# Patient Record
Sex: MALE | Race: BLACK OR AFRICAN AMERICAN | URBAN - METROPOLITAN AREA
[De-identification: names, ages, dates, MRNs, and addresses within clinical notes are randomized per-mention and may not be internally consistent; named-entity substitution may affect disease eponyms.]

---

## 2022-01-17 SDOH — HEALTH STABILITY: PHYSICAL HEALTH: ON AVERAGE, HOW MANY DAYS PER WEEK DO YOU ENGAGE IN MODERATE TO STRENUOUS EXERCISE (LIKE A BRISK WALK)?: 2 DAYS

## 2022-01-17 SDOH — HEALTH STABILITY: PHYSICAL HEALTH: ON AVERAGE, HOW MANY MINUTES DO YOU ENGAGE IN EXERCISE AT THIS LEVEL?: 60 MIN

## 2022-01-17 ASSESSMENT — SOCIAL DETERMINANTS OF HEALTH (SDOH)

## 2022-01-19 ENCOUNTER — OFFICE VISIT (OUTPATIENT)
Dept: INTERNAL MEDICINE CLINIC | Age: 42
End: 2022-01-19
Payer: COMMERCIAL

## 2022-01-19 VITALS
HEART RATE: 85 BPM | OXYGEN SATURATION: 100 % | BODY MASS INDEX: 31.36 KG/M2 | DIASTOLIC BLOOD PRESSURE: 96 MMHG | WEIGHT: 224 LBS | HEIGHT: 71 IN | SYSTOLIC BLOOD PRESSURE: 146 MMHG

## 2022-01-19 DIAGNOSIS — R42 DIZZINESS: ICD-10-CM

## 2022-01-19 DIAGNOSIS — Z13.1 SCREENING FOR DIABETES MELLITUS: ICD-10-CM

## 2022-01-19 DIAGNOSIS — Z00.00 ENCOUNTER FOR WELL ADULT EXAM WITHOUT ABNORMAL FINDINGS: ICD-10-CM

## 2022-01-19 DIAGNOSIS — Z11.59 NEED FOR HEPATITIS C SCREENING TEST: ICD-10-CM

## 2022-01-19 DIAGNOSIS — Z13.0 SCREENING FOR DEFICIENCY ANEMIA: ICD-10-CM

## 2022-01-19 DIAGNOSIS — R03.0 ELEVATED BLOOD PRESSURE READING: ICD-10-CM

## 2022-01-19 DIAGNOSIS — Z13.220 SCREENING FOR LIPID DISORDERS: ICD-10-CM

## 2022-01-19 DIAGNOSIS — R03.0 ELEVATED BLOOD PRESSURE READING: Primary | ICD-10-CM

## 2022-01-19 DIAGNOSIS — Z11.4 SCREENING FOR HIV WITHOUT PRESENCE OF RISK FACTORS: ICD-10-CM

## 2022-01-19 LAB
A/G RATIO: 1.6 (ref 1.1–2.2)
ALBUMIN SERPL-MCNC: 4.5 G/DL (ref 3.4–5)
ALP BLD-CCNC: 91 U/L (ref 40–129)
ALT SERPL-CCNC: 56 U/L (ref 10–40)
ANION GAP SERPL CALCULATED.3IONS-SCNC: 15 MMOL/L (ref 3–16)
AST SERPL-CCNC: 31 U/L (ref 15–37)
BASOPHILS ABSOLUTE: 0 K/UL (ref 0–0.2)
BASOPHILS RELATIVE PERCENT: 0.3 %
BILIRUB SERPL-MCNC: 0.5 MG/DL (ref 0–1)
BUN BLDV-MCNC: 11 MG/DL (ref 7–20)
CALCIUM SERPL-MCNC: 9.5 MG/DL (ref 8.3–10.6)
CHLORIDE BLD-SCNC: 102 MMOL/L (ref 99–110)
CHOLESTEROL, TOTAL: 213 MG/DL (ref 0–199)
CO2: 22 MMOL/L (ref 21–32)
CREAT SERPL-MCNC: 1.1 MG/DL (ref 0.9–1.3)
EOSINOPHILS ABSOLUTE: 0.1 K/UL (ref 0–0.6)
EOSINOPHILS RELATIVE PERCENT: 2 %
GFR AFRICAN AMERICAN: >60
GFR NON-AFRICAN AMERICAN: >60
GLUCOSE BLD-MCNC: 97 MG/DL (ref 70–99)
HCT VFR BLD CALC: 45.1 % (ref 40.5–52.5)
HDLC SERPL-MCNC: 35 MG/DL (ref 40–60)
HEMOGLOBIN: 15 G/DL (ref 13.5–17.5)
HEPATITIS C ANTIBODY INTERPRETATION: NORMAL
LDL CHOLESTEROL CALCULATED: 160 MG/DL
LYMPHOCYTES ABSOLUTE: 2.6 K/UL (ref 1–5.1)
LYMPHOCYTES RELATIVE PERCENT: 53.7 %
MCH RBC QN AUTO: 29.5 PG (ref 26–34)
MCHC RBC AUTO-ENTMCNC: 33.3 G/DL (ref 31–36)
MCV RBC AUTO: 88.8 FL (ref 80–100)
MONOCYTES ABSOLUTE: 0.5 K/UL (ref 0–1.3)
MONOCYTES RELATIVE PERCENT: 11.3 %
NEUTROPHILS ABSOLUTE: 1.6 K/UL (ref 1.7–7.7)
NEUTROPHILS RELATIVE PERCENT: 32.7 %
PDW BLD-RTO: 14.2 % (ref 12.4–15.4)
PLATELET # BLD: 256 K/UL (ref 135–450)
PMV BLD AUTO: 8.7 FL (ref 5–10.5)
POTASSIUM SERPL-SCNC: 4.5 MMOL/L (ref 3.5–5.1)
RBC # BLD: 5.08 M/UL (ref 4.2–5.9)
SODIUM BLD-SCNC: 139 MMOL/L (ref 136–145)
TOTAL PROTEIN: 7.3 G/DL (ref 6.4–8.2)
TRIGL SERPL-MCNC: 89 MG/DL (ref 0–150)
VLDLC SERPL CALC-MCNC: 18 MG/DL
WBC # BLD: 4.8 K/UL (ref 4–11)

## 2022-01-19 PROCEDURE — 99386 PREV VISIT NEW AGE 40-64: CPT | Performed by: FAMILY MEDICINE

## 2022-01-19 PROCEDURE — 99204 OFFICE O/P NEW MOD 45 MIN: CPT | Performed by: FAMILY MEDICINE

## 2022-01-19 PROCEDURE — 90674 CCIIV4 VAC NO PRSV 0.5 ML IM: CPT | Performed by: FAMILY MEDICINE

## 2022-01-19 PROCEDURE — 90471 IMMUNIZATION ADMIN: CPT | Performed by: FAMILY MEDICINE

## 2022-01-19 RX ORDER — DEXTROMETHORPHAN HYDROBROMIDE AND PROMETHAZINE HYDROCHLORIDE 15; 6.25 MG/5ML; MG/5ML
SYRUP ORAL
COMMUNITY
Start: 2021-10-15 | End: 2022-01-19

## 2022-01-19 RX ORDER — IBUPROFEN 800 MG/1
TABLET ORAL
COMMUNITY
Start: 2021-10-15 | End: 2022-01-19

## 2022-01-19 ASSESSMENT — ENCOUNTER SYMPTOMS
CONSTIPATION: 0
COUGH: 0
NAUSEA: 0
SHORTNESS OF BREATH: 0
VOMITING: 0
DIARRHEA: 0

## 2022-01-19 ASSESSMENT — PATIENT HEALTH QUESTIONNAIRE - PHQ9
SUM OF ALL RESPONSES TO PHQ QUESTIONS 1-9: 0
1. LITTLE INTEREST OR PLEASURE IN DOING THINGS: 0
2. FEELING DOWN, DEPRESSED OR HOPELESS: 0
SUM OF ALL RESPONSES TO PHQ QUESTIONS 1-9: 0
SUM OF ALL RESPONSES TO PHQ9 QUESTIONS 1 & 2: 0

## 2022-01-19 NOTE — PROGRESS NOTES
Criselda Wheeler (:  1980) is a 35 y.o. male,New patient, here for evaluation of the following chief complaint(s):  New Patient      SUBJECTIVE:  Last primary doctor was 4 years ago  Was not diagnosed with any mecial problems    Since December he has had intermittent episodes of dizziness once or twice per week. Not currently having symptoms. It comes and goes. Happened more often in December, but no longer happening  Also states that he has had intermittent dry throat once or twice per week,     Currently working [employer]. Works at [employer] for them. I have reviewed the chart notes available from myself and other providers. I have reviewed and addressed all active problems and created or updated the problems list in detail, as needed    I have extensively reviewed and reconciled the medication list, discontinued medications not taking or no longer appropriate, and updated the active meds list    OBJECTIVE:  Review of Systems   Constitutional: Negative for chills and fever. Respiratory: Negative for cough and shortness of breath. Cardiovascular: Negative for leg swelling. Gastrointestinal: Negative for constipation, diarrhea, nausea and vomiting. Endocrine: Negative for polyuria. Genitourinary: Negative for frequency. Skin: Negative for rash. Vitals:    22 1119 22 1141   BP: (!) 144/96 (!) 146/96   Pulse: 85    SpO2: 100%    Weight: 224 lb (101.6 kg)    Height: 5' 10.5\" (1.791 m)       Body mass index is 31.69 kg/m². Physical Exam  Constitutional:       Appearance: Normal appearance. Cardiovascular:      Rate and Rhythm: Normal rate and regular rhythm. Pulses: Normal pulses. Heart sounds: Normal heart sounds. Pulmonary:      Effort: Pulmonary effort is normal.      Breath sounds: Normal breath sounds. Musculoskeletal:      Right lower leg: No edema. Left lower leg: No edema.    Neurological:      General: No focal deficit present. Mental Status: He is alert. Mental status is at baseline. No results found for: LABA1C  No results found for: WBC, HGB, HCT, MCV, PLT   No results found for: NA, K, CL, CO2, BUN, CREATININE, GLUCOSE, CALCIUM    No results found for: CHOL  No results found for: TRIG  No results found for: HDL  No results found for: LDLCHOLESTEROL, LDLCALC  No results found for: LABVLDL, VLDL  No results found for: CHOLHDLRATIO     The ASCVD Risk score (Linsey Tillman, et al., 2013) failed to calculate for the following reasons:    Cannot find a previous HDL lab    Cannot find a previous total cholesterol lab     Patient received counseling and, if relevant, printed instructions for all symptoms listed in CC and HPI, as well as for all diagnoses brought onto today's visit note below. Typical counseling includes, but is not limited to, non-pharmacologic measures to manage listed symptoms and conditions; appropriate use, risks and benefits for all prescribed medications; potential interactions between medications both prescribed and OTC; diet; exercise; healthy behaviors; and goalsetting to improve health. Patient or responsible party was involved in shared decision making and had opportunity to have all questions answered. Except as noted below, all chronic problems have been reviewed and are stable to continue medications or other therapy as previously documented in the patient's chart, with changes per orders or documentation below:    1. Elevated blood pressure reading  -     Comprehensive Metabolic Panel; Future  2. Dizziness  -     CBC Auto Differential; Future  3. Encounter for well adult exam without abnormal findings  4. Screening for HIV without presence of risk factors  -     HIV Screen; Future  5. Need for hepatitis C screening test  -     Hepatitis C Antibody; Future  6. Screening for diabetes mellitus  -     Hemoglobin A1C; Future  7.  Screening for deficiency anemia  -     CBC Auto only contact the office one time per issue or question, and please allow one full business day for a call back. Calling us back multiple times keeps us from being able to complete the work efficiently for you and our other patients. For medication renewals, please call your pharmacist to contact us, and be sure to allow at least 3 business days for processing before you need to  your medication. If you are sick or need an appointment that hasn't been planned, same day appointments are available every day the office is open: Monday, Tuesday, Wednesday, Thursday, and Friday. Call during office hours to schedule, even if it may not be with your regular physician. You may also call the office after 8 am on office days if you need to be seen from an issue the night before. During hours when the office is not normally open, your call will go to the messaging service which cannot provide any service other than paging the doctor. No prescriptions or other nonurgent matters will be handled and no voicemail is available, so please call back during office hours for these matters.        Electronically signed by Angely Murdock DO on 1/19/2022 at 12:52 PM.

## 2022-01-20 LAB
ESTIMATED AVERAGE GLUCOSE: 125.5 MG/DL
HBA1C MFR BLD: 6 %
HIV AG/AB: NORMAL
HIV ANTIGEN: NORMAL
HIV-1 ANTIBODY: NORMAL
HIV-2 AB: NORMAL

## 2022-01-21 DIAGNOSIS — E78.2 MIXED HYPERLIPIDEMIA: Primary | ICD-10-CM

## 2022-01-21 RX ORDER — ATORVASTATIN CALCIUM 20 MG/1
20 TABLET, FILM COATED ORAL DAILY
Qty: 90 TABLET | Refills: 1 | Status: SHIPPED | OUTPATIENT
Start: 2022-01-21 | End: 2022-07-14

## 2022-01-27 ENCOUNTER — TELEPHONE (OUTPATIENT)
Dept: INTERNAL MEDICINE CLINIC | Age: 42
End: 2022-01-27

## 2022-03-02 ENCOUNTER — OFFICE VISIT (OUTPATIENT)
Dept: INTERNAL MEDICINE CLINIC | Age: 42
End: 2022-03-02
Payer: COMMERCIAL

## 2022-03-02 VITALS
WEIGHT: 218 LBS | HEART RATE: 86 BPM | OXYGEN SATURATION: 100 % | DIASTOLIC BLOOD PRESSURE: 83 MMHG | SYSTOLIC BLOOD PRESSURE: 129 MMHG | BODY MASS INDEX: 30.84 KG/M2

## 2022-03-02 DIAGNOSIS — E78.2 MIXED HYPERLIPIDEMIA: Primary | ICD-10-CM

## 2022-03-02 DIAGNOSIS — R73.03 PREDIABETES: ICD-10-CM

## 2022-03-02 DIAGNOSIS — E78.2 MIXED HYPERLIPIDEMIA: ICD-10-CM

## 2022-03-02 PROBLEM — R42 DIZZINESS: Status: RESOLVED | Noted: 2022-01-19 | Resolved: 2022-03-02

## 2022-03-02 LAB
CHOLESTEROL, TOTAL: 130 MG/DL (ref 0–199)
HDLC SERPL-MCNC: 37 MG/DL (ref 40–60)
LDL CHOLESTEROL CALCULATED: 80 MG/DL
TRIGL SERPL-MCNC: 64 MG/DL (ref 0–150)
VLDLC SERPL CALC-MCNC: 13 MG/DL

## 2022-03-02 PROCEDURE — 99214 OFFICE O/P EST MOD 30 MIN: CPT | Performed by: FAMILY MEDICINE

## 2022-03-02 ASSESSMENT — ENCOUNTER SYMPTOMS
NAUSEA: 0
DIARRHEA: 0
VOMITING: 0
SHORTNESS OF BREATH: 0
CONSTIPATION: 0
COUGH: 0

## 2022-03-02 NOTE — PROGRESS NOTES
Devyn Wheeler (:  1980) is a 35 y.o. male,New patient, here for evaluation of the following chief complaint(s):  Follow-up      SUBJECTIVE:  3. -- stopped eating sugary treats, fatty greasy food. Kids are 2 and 1. Last primary doctor was 4 years ago  Was not diagnosed with any mecial problems    Since December he has had intermittent episodes of dizziness once or twice per week. Not currently having symptoms. It comes and goes. Happened more often in December, but no longer happening  Also states that he has had intermittent dry throat once or twice per week,     Currently working [employer]. Works at [employer] for [employer]. I have reviewed the chart notes available from myself and other providers. I have reviewed and addressed all active problems and created or updated the problems list in detail, as needed    I have extensively reviewed and reconciled the medication list, discontinued medications not taking or no longer appropriate, and updated the active meds list    OBJECTIVE:  Review of Systems   Constitutional: Negative for chills and fever. Respiratory: Negative for cough and shortness of breath. Cardiovascular: Negative for leg swelling. Gastrointestinal: Negative for constipation, diarrhea, nausea and vomiting. Endocrine: Negative for polyuria. Genitourinary: Negative for frequency. Skin: Negative for rash. Vitals:    22 1351   BP: 129/83   Pulse: 86   SpO2: 100%   Weight: 218 lb (98.9 kg)      Body mass index is 30.84 kg/m². Physical Exam  Constitutional:       Appearance: Normal appearance. Cardiovascular:      Rate and Rhythm: Normal rate and regular rhythm. Pulses: Normal pulses. Heart sounds: Normal heart sounds. Pulmonary:      Effort: Pulmonary effort is normal.      Breath sounds: Normal breath sounds. Musculoskeletal:      Right lower leg: No edema. Left lower leg: No edema.    Neurological: General: No focal deficit present. Mental Status: He is alert. Mental status is at baseline. Lab Results   Component Value Date    LABA1C 6.0 01/19/2022     Lab Results   Component Value Date    WBC 4.8 01/19/2022    HGB 15.0 01/19/2022    HCT 45.1 01/19/2022    MCV 88.8 01/19/2022     01/19/2022      Lab Results   Component Value Date     01/19/2022    K 4.5 01/19/2022     01/19/2022    CO2 22 01/19/2022    BUN 11 01/19/2022    CREATININE 1.1 01/19/2022    GLUCOSE 97 01/19/2022    CALCIUM 9.5 01/19/2022       Lab Results   Component Value Date    CHOL 213 (H) 01/19/2022     Lab Results   Component Value Date    TRIG 89 01/19/2022     Lab Results   Component Value Date    HDL 35 (L) 01/19/2022     Lab Results   Component Value Date    LDLCALC 160 (H) 01/19/2022     Lab Results   Component Value Date    LABVLDL 18 01/19/2022     No results found for: CHOLHDLRATIO     The 10-year ASCVD risk score (Nuria Foss et al., 2013) is: 4.2%    Values used to calculate the score:      Age: 35 years      Sex: Male      Is Non- : Yes      Diabetic: No      Tobacco smoker: No      Systolic Blood Pressure: 487 mmHg      Is BP treated: No      HDL Cholesterol: 35 mg/dL      Total Cholesterol: 213 mg/dL     Patient received counseling and, if relevant, printed instructions for all symptoms listed in CC and HPI, as well as for all diagnoses brought onto today's visit note below. Typical counseling includes, but is not limited to, non-pharmacologic measures to manage listed symptoms and conditions; appropriate use, risks and benefits for all prescribed medications; potential interactions between medications both prescribed and OTC; diet; exercise; healthy behaviors; and goalsetting to improve health. Patient or responsible party was involved in shared decision making and had opportunity to have all questions answered.   Except as noted below, all chronic problems have been reviewed and are stable to continue medications or other therapy as previously documented in the patient's chart, with changes per orders or documentation below:    1. Mixed hyperlipidemia  -     Lipid Panel; Future  2. Prediabetes          Problem List        Unprioritized    Mixed hyperlipidemia - Primary    Relevant Medications    atorvastatin (LIPITOR) 20 MG tablet    Other Relevant Orders    Lipid Panel    Prediabetes        Orders Placed This Encounter   Procedures    Lipid Panel     Standing Status:   Future     Number of Occurrences:   1     Standing Expiration Date:   3/2/2023     Order Specific Question:   Is Patient Fasting?/# of Hours     Answer:   yes -- ok if not per DR. Lita Hudson       Return in about 2 months (around 5/2/2022) for recheck A1c this visit, address lipid lab. [facility] - [facility]  Dr. Jarrett Flowers D.O.  - Family Medicine and T      Usual doctor's hours are:     Monday 7:30 am to 6:00 pm           Tuesday  7:30 am to 5:00 pm                                               Wednesday 7:30 am to 5:00 pm                                               Thursday 7:30 am to 5:00 pm                                               Friday 7:30 am to 4:00 pm           Saturdays, Sundays, and after hours: E-Visits are available    We observe most federal holidays and Good Friday. We ask that you only contact the office one time per issue or question, and please allow one full business day for a call back. Calling us back multiple times keeps us from being able to complete the work efficiently for you and our other patients. For medication renewals, please call your pharmacist to contact us, and be sure to allow at least 3 business days for processing before you need to  your medication.      If you are sick or need an appointment that hasn't been planned, same day appointments are available every day the office is open: Monday, Tuesday, Wednesday, Thursday, and Friday. Call during office hours to schedule, even if it may not be with your regular physician. You may also call the office after 8 am on office days if you need to be seen from an issue the night before. During hours when the office is not normally open, your call will go to the messaging service which cannot provide any service other than paging the doctor. No prescriptions or other nonurgent matters will be handled and no voicemail is available, so please call back during office hours for these matters.        Electronically signed by Delaney Villalta DO on 3/2/2022 at 3:19 PM.

## 2022-05-02 ENCOUNTER — OFFICE VISIT (OUTPATIENT)
Dept: INTERNAL MEDICINE CLINIC | Age: 42
End: 2022-05-02
Payer: COMMERCIAL

## 2022-05-02 VITALS
BODY MASS INDEX: 30.55 KG/M2 | DIASTOLIC BLOOD PRESSURE: 80 MMHG | WEIGHT: 216 LBS | OXYGEN SATURATION: 98 % | SYSTOLIC BLOOD PRESSURE: 127 MMHG | HEART RATE: 73 BPM

## 2022-05-02 DIAGNOSIS — E78.2 MIXED HYPERLIPIDEMIA: ICD-10-CM

## 2022-05-02 DIAGNOSIS — R73.03 PREDIABETES: ICD-10-CM

## 2022-05-02 DIAGNOSIS — R73.03 PREDIABETES: Primary | ICD-10-CM

## 2022-05-02 LAB
CHOLESTEROL, TOTAL: 162 MG/DL (ref 0–199)
HDLC SERPL-MCNC: 37 MG/DL (ref 40–60)
LDL CHOLESTEROL CALCULATED: 104 MG/DL
TRIGL SERPL-MCNC: 107 MG/DL (ref 0–150)
VLDLC SERPL CALC-MCNC: 21 MG/DL

## 2022-05-02 PROCEDURE — 90471 IMMUNIZATION ADMIN: CPT | Performed by: FAMILY MEDICINE

## 2022-05-02 PROCEDURE — 99214 OFFICE O/P EST MOD 30 MIN: CPT | Performed by: FAMILY MEDICINE

## 2022-05-02 PROCEDURE — 90715 TDAP VACCINE 7 YRS/> IM: CPT | Performed by: FAMILY MEDICINE

## 2022-05-02 SDOH — ECONOMIC STABILITY: FOOD INSECURITY: WITHIN THE PAST 12 MONTHS, YOU WORRIED THAT YOUR FOOD WOULD RUN OUT BEFORE YOU GOT MONEY TO BUY MORE.: NEVER TRUE

## 2022-05-02 SDOH — ECONOMIC STABILITY: FOOD INSECURITY: WITHIN THE PAST 12 MONTHS, THE FOOD YOU BOUGHT JUST DIDN'T LAST AND YOU DIDN'T HAVE MONEY TO GET MORE.: NEVER TRUE

## 2022-05-02 ASSESSMENT — ENCOUNTER SYMPTOMS
VOMITING: 0
COUGH: 0
DIARRHEA: 0
CONSTIPATION: 0
NAUSEA: 0
SHORTNESS OF BREATH: 0

## 2022-05-02 ASSESSMENT — SOCIAL DETERMINANTS OF HEALTH (SDOH): HOW HARD IS IT FOR YOU TO PAY FOR THE VERY BASICS LIKE FOOD, HOUSING, MEDICAL CARE, AND HEATING?: NOT HARD AT ALL

## 2022-05-02 NOTE — PROGRESS NOTES
Devyn Wheeler (:  1980) is a 38 y.o. male,Established patient, here for evaluation of the following chief complaint(s):  Follow-up (2 MO. )      SUBJECTIVE:  5..22 -- follow up after Lipitor. Last A1c was 6.0 in January, will recheck A1c as well to see if this is improved. Pt has lost almost 10 lb since January, congratulated him on this and on his diet changes. -- needs TDAP today. 3.2.22 -- stopped eating sugary treats, fatty greasy food. Kids are 2 and 1.     22 - new patient  Last primary doctor was 4 years ago  Was not diagnosed with any medical problems    Since December he has had intermittent episodes of dizziness once or twice per week. Not currently having symptoms. It comes and goes. Happened more often in December, but no longer happening  Also states that he has had intermittent dry throat once or twice per week,     Currently working [employer]. Works at [employer]. I have reviewed the chart notes available from myself and other providers. I have reviewed and addressed all active problems and created or updated the problems list in detail, as needed    I have extensively reviewed and reconciled the medication list, discontinued medications not taking or no longer appropriate, and updated the active meds list    OBJECTIVE:  Review of Systems   Constitutional: Negative for chills and fever. Respiratory: Negative for cough and shortness of breath. Cardiovascular: Negative for leg swelling. Gastrointestinal: Negative for constipation, diarrhea, nausea and vomiting. Endocrine: Negative for polyuria. Genitourinary: Negative for frequency. Skin: Negative for rash. Vitals:    22 1352   BP: 127/80   Pulse: 73   SpO2: 98%   Weight: 216 lb (98 kg)      Body mass index is 30.55 kg/m². Physical Exam  Constitutional:       Appearance: Normal appearance.    Cardiovascular:      Rate and Rhythm: Normal rate and regular rhythm. Pulses: Normal pulses. Heart sounds: Normal heart sounds. Pulmonary:      Effort: Pulmonary effort is normal.      Breath sounds: Normal breath sounds. Musculoskeletal:      Right lower leg: No edema. Left lower leg: No edema. Neurological:      General: No focal deficit present. Mental Status: He is alert. Mental status is at baseline. Lab Results   Component Value Date    LABA1C 6.0 01/19/2022     Lab Results   Component Value Date    WBC 4.8 01/19/2022    HGB 15.0 01/19/2022    HCT 45.1 01/19/2022    MCV 88.8 01/19/2022     01/19/2022      Lab Results   Component Value Date     01/19/2022    K 4.5 01/19/2022     01/19/2022    CO2 22 01/19/2022    BUN 11 01/19/2022    CREATININE 1.1 01/19/2022    GLUCOSE 97 01/19/2022    CALCIUM 9.5 01/19/2022       Lab Results   Component Value Date    CHOL 130 03/02/2022    CHOL 213 (H) 01/19/2022     Lab Results   Component Value Date    TRIG 64 03/02/2022    TRIG 89 01/19/2022     Lab Results   Component Value Date    HDL 37 (L) 03/02/2022    HDL 35 (L) 01/19/2022     Lab Results   Component Value Date    LDLCALC 80 03/02/2022    LDLCALC 160 (H) 01/19/2022     Lab Results   Component Value Date    LABVLDL 13 03/02/2022    LABVLDL 18 01/19/2022     No results found for: CHOLHDLRATIO     The 10-year ASCVD risk score (Brad Krause, et al., 2013) is: 3.7%    Values used to calculate the score:      Age: 38 years      Sex: Male      Is Non- : Yes      Diabetic: No      Tobacco smoker: No      Systolic Blood Pressure: 386 mmHg      Is BP treated: No      HDL Cholesterol: 37 mg/dL      Total Cholesterol: 130 mg/dL     Patient received counseling and, if relevant, printed instructions for all symptoms listed in CC and HPI, as well as for all diagnoses brought onto today's visit note below.  Typical counseling includes, but is not limited to, non-pharmacologic measures to manage listed symptoms and conditions; appropriate use, risks and benefits for all prescribed medications; potential interactions between medications both prescribed and OTC; diet; exercise; healthy behaviors; and goalsetting to improve health. Patient or responsible party was involved in shared decision making and had opportunity to have all questions answered. Except as noted below, all chronic problems have been reviewed and are stable to continue medications or other therapy as previously documented in the patient's chart, with changes per orders or documentation below:    1. Prediabetes  -     Hemoglobin A1C; Future  2. Mixed hyperlipidemia  -     Lipid Panel; Future          Problem List        Unprioritized    Mixed hyperlipidemia    Relevant Medications    atorvastatin (LIPITOR) 20 MG tablet    Other Relevant Orders    Lipid Panel    Prediabetes - Primary    Relevant Orders    Hemoglobin A1C        Orders Placed This Encounter   Procedures    Tdap (age 6y and older) IM ([address])    Lipid Panel     Standing Status:   Future     Number of Occurrences:   1     Standing Expiration Date:   5/2/2023     Order Specific Question:   Is Patient Fasting?/# of Hours     Answer:   yes    Hemoglobin A1C     Standing Status:   Future     Number of Occurrences:   1     Standing Expiration Date:   5/2/2023       Return in about 6 months (around 11/2/2022) for follow up hyperlipids, prediabetes. [facility] - Internal Medicine and Pediatrics  Dr. Daphnie Velez D.O.  - Family Medicine and T      Usual doctor's hours are:     Monday 7:30 am to 6:00 pm           Tuesday  7:30 am to 5:00 pm                                               Wednesday 7:30 am to 5:00 pm                                               Thursday 7:30 am to 5:00 pm                                               Friday 7:30 am to 4:00 pm           Saturdays, Sundays, and after hours: E-Visits are available    We observe most federal holidays and Good Friday.     We ask that you only contact the office one time per issue or question, and please allow one full business day for a call back. Calling us back multiple times keeps us from being able to complete the work efficiently for you and our other patients. For medication renewals, please call your pharmacist to contact us, and be sure to allow at least 3 business days for processing before you need to  your medication. If you are sick or need an appointment that hasn't been planned, same day appointments are available every day the office is open: Monday, Tuesday, Wednesday, Thursday, and Friday. Call during office hours to schedule, even if it may not be with your regular physician. You may also call the office after 8 am on office days if you need to be seen from an issue the night before. During hours when the office is not normally open, your call will go to the messaging service which cannot provide any service other than paging the doctor. No prescriptions or other nonurgent matters will be handled and no voicemail is available, so please call back during office hours for these matters.        Electronically signed by Gladis Jamison DO on 5/2/2022 at 2:54 PM.

## 2022-05-03 LAB
ESTIMATED AVERAGE GLUCOSE: 119.8 MG/DL
HBA1C MFR BLD: 5.8 %

## 2022-07-14 DIAGNOSIS — E78.2 MIXED HYPERLIPIDEMIA: ICD-10-CM

## 2022-07-14 RX ORDER — ATORVASTATIN CALCIUM 20 MG/1
20 TABLET, FILM COATED ORAL DAILY
Qty: 90 TABLET | Refills: 0 | Status: SHIPPED | OUTPATIENT
Start: 2022-07-14 | End: 2022-10-10

## 2022-10-09 DIAGNOSIS — E78.2 MIXED HYPERLIPIDEMIA: ICD-10-CM

## 2022-10-10 RX ORDER — ATORVASTATIN CALCIUM 20 MG/1
20 TABLET, FILM COATED ORAL DAILY
Qty: 90 TABLET | Refills: 2 | Status: SHIPPED | OUTPATIENT
Start: 2022-10-10

## 2022-10-10 NOTE — TELEPHONE ENCOUNTER
Requested Prescriptions     Pending Prescriptions Disp Refills    atorvastatin (LIPITOR) 20 MG tablet [Pharmacy Med Name: ATORVASTATIN 20MG TABLETS] 90 tablet 2     Sig: TAKE 1 TABLET BY MOUTH DAILY   Patient requesting a medication refill.   Pharmacy: [facility]  Next office visit: Visit date not found  Last regular office visit: 5/2/2022

## 2024-06-24 SDOH — HEALTH STABILITY: PHYSICAL HEALTH: ON AVERAGE, HOW MANY DAYS PER WEEK DO YOU ENGAGE IN MODERATE TO STRENUOUS EXERCISE (LIKE A BRISK WALK)?: 3 DAYS

## 2024-06-24 SDOH — HEALTH STABILITY: PHYSICAL HEALTH: ON AVERAGE, HOW MANY MINUTES DO YOU ENGAGE IN EXERCISE AT THIS LEVEL?: 60 MIN

## 2024-06-27 ENCOUNTER — OFFICE VISIT (OUTPATIENT)
Dept: ORTHOPEDIC SURGERY | Age: 44
End: 2024-06-27
Payer: COMMERCIAL

## 2024-06-27 VITALS — HEIGHT: 77 IN | WEIGHT: 219 LBS | BODY MASS INDEX: 25.86 KG/M2

## 2024-06-27 DIAGNOSIS — M22.42 CHONDROMALACIA OF LEFT PATELLOFEMORAL JOINT: Primary | ICD-10-CM

## 2024-06-27 PROCEDURE — 99203 OFFICE O/P NEW LOW 30 MIN: CPT | Performed by: ORTHOPAEDIC SURGERY

## 2024-06-28 RX ORDER — NAPROXEN SODIUM 550 MG/1
550 TABLET ORAL 2 TIMES DAILY WITH MEALS
Qty: 60 TABLET | Refills: 3 | Status: SHIPPED | OUTPATIENT
Start: 2024-06-28

## 2024-06-28 NOTE — PROGRESS NOTES
[facility]  Office Visit    Chief Complaint: Left knee pain    HPI:  Devyn Wheeler is a 44 y.o. who is here for initial evaluation of left knee pain.  He reports left knee pain for the past 2 years that has been worsening over the last 2 weeks.  He describes it as stiffness in the knee and trouble bending the knee.  He has anterior knee pain with knee flexion.  There is no history of injury or surgery to the knee.  He has tried IcyHot and a knee brace.  Symptoms are worse with sitting long periods of time.  He remains active and walks 1 hour at a time 3 days/week.  He rates pain as 5/10.  He denies hip pain.  He is otherwise healthy and walks without assistive device.      Past Medical History:   Diagnosis Date    Dizziness 2022        ROS:  Constitutional: denies fever, chills, weight loss  MSK: denies pain in other joints, muscle aches  Neurological: denies numbness, tingling, weakness    Exam:  Ht 1.956 m (6' 5\")   Wt 99.3 kg (219 lb)   BMI 25.97 kg/m²      Appearance: sitting in exam room chair, appears to be in no acute distress, awake and alert  Resp: unlabored breathing on room air  Skin: warm, dry and intact with out erythema or significant increased temperature  Neuro: grossly intact both lower extremities. Intact sensation to light touch. Motor exam 4+ to 5/5 in all major motor groups.  LLE: Examination demonstrates negative logroll and negative Stinchfield.  Examination reveals that active knee range of motion is 0 to 135 degrees.  There is patellofemoral crepitus.  Neurologically, plantar flexion and dorsiflexion is intact. 5/5 strength.     Imagin views of the left knee were performed and reviewed today.  He has maintained joint spaces with no significant osteophytes.  There are no fractures or dislocations.    Assessment:  Left knee patellofemoral chondromalacia    Plan:  We discussed the diagnosis and treatment options.  We discussed treatment options for

## 2024-07-15 ENCOUNTER — TELEPHONE (OUTPATIENT)
Dept: ORTHOPEDIC SURGERY | Age: 44
End: 2024-07-15

## 2024-07-19 ENCOUNTER — OFFICE VISIT (OUTPATIENT)
Dept: ORTHOPEDIC SURGERY | Age: 44
End: 2024-07-19

## 2024-07-19 VITALS — HEIGHT: 77 IN | WEIGHT: 219 LBS | BODY MASS INDEX: 25.86 KG/M2

## 2024-07-19 DIAGNOSIS — M22.42 CHONDROMALACIA OF LEFT PATELLOFEMORAL JOINT: Primary | ICD-10-CM

## 2024-07-19 RX ORDER — TRIAMCINOLONE ACETONIDE 40 MG/ML
40 INJECTION, SUSPENSION INTRA-ARTICULAR; INTRAMUSCULAR ONCE
Status: COMPLETED | OUTPATIENT
Start: 2024-07-19 | End: 2024-07-19

## 2024-07-19 RX ORDER — BUPIVACAINE HYDROCHLORIDE 2.5 MG/ML
2 INJECTION, SOLUTION INFILTRATION; PERINEURAL ONCE
Status: COMPLETED | OUTPATIENT
Start: 2024-07-19 | End: 2024-07-19

## 2024-07-19 RX ADMIN — TRIAMCINOLONE ACETONIDE 40 MG: 40 INJECTION, SUSPENSION INTRA-ARTICULAR; INTRAMUSCULAR at 09:09

## 2024-07-19 RX ADMIN — BUPIVACAINE HYDROCHLORIDE 5 MG: 2.5 INJECTION, SOLUTION INFILTRATION; PERINEURAL at 09:09

## 2024-07-19 NOTE — PROGRESS NOTES
[facility]  Office Visit    Chief Complaint: Left knee pain    HPI:  Devyn Wheeler is a 44 y.o. who is here in follow-up of left knee pain. He was initially seen 3 weeks ago and diagnosed with mile left knee OA. He was prescribed naproxen and home exercises and reports only mild relief of symptoms. For review, he reports left knee pain for the past 2 years that has been worsening over the last 1 month.  He describes it as stiffness in the knee and trouble bending the knee.  He has anterior knee pain with knee flexion.  There is no history of injury or surgery to the knee.  He has tried IcyHot and a knee brace.  Symptoms are worse with sitting long periods of time.  He remains active.  He denies hip pain.      Past Medical History:   Diagnosis Date    Dizziness 1/19/2022        ROS:  Constitutional: denies fever, chills, weight loss  MSK: denies pain in other joints, muscle aches  Neurological: denies numbness, tingling, weakness    Exam:  Ht 1.956 m (6' 5\")   Wt 99.3 kg (219 lb)   BMI 25.97 kg/m²      Appearance: sitting in exam room chair, appears to be in no acute distress, awake and alert  Resp: unlabored breathing on room air  Skin: warm, dry and intact with out erythema or significant increased temperature  Neuro: grossly intact both lower extremities. Intact sensation to light touch. Motor exam 4+ to 5/5 in all major motor groups.  LLE: Examination demonstrates negative logroll and negative Stinchfield.  Examination reveals that active knee range of motion is 0 to 135 degrees.  There is patellofemoral crepitus.  Neurologically, plantar flexion and dorsiflexion is intact. 5/5 strength.     Imaging:  Prior left knee radiographs were reviewed today.  He has mild joint space narrowing of the medial and patellofemoral compartments.    Assessment:  Mild left knee osteoarthritis    Plan:  We discussed the diagnosis and treatment options.  We discussed treatment options for OA to

## 2025-03-10 ASSESSMENT — PATIENT HEALTH QUESTIONNAIRE - PHQ9
SUM OF ALL RESPONSES TO PHQ QUESTIONS 1-9: 1
SUM OF ALL RESPONSES TO PHQ9 QUESTIONS 1 & 2: 1
2. FEELING DOWN, DEPRESSED OR HOPELESS: NOT AT ALL
SUM OF ALL RESPONSES TO PHQ QUESTIONS 1-9: 1
1. LITTLE INTEREST OR PLEASURE IN DOING THINGS: SEVERAL DAYS
1. LITTLE INTEREST OR PLEASURE IN DOING THINGS: SEVERAL DAYS
2. FEELING DOWN, DEPRESSED OR HOPELESS: NOT AT ALL
SUM OF ALL RESPONSES TO PHQ QUESTIONS 1-9: 1
SUM OF ALL RESPONSES TO PHQ QUESTIONS 1-9: 1

## 2025-03-13 ENCOUNTER — OFFICE VISIT (OUTPATIENT)
Dept: PRIMARY CARE CLINIC | Age: 45
End: 2025-03-13
Payer: COMMERCIAL

## 2025-03-13 VITALS
DIASTOLIC BLOOD PRESSURE: 83 MMHG | HEART RATE: 67 BPM | HEIGHT: 77 IN | TEMPERATURE: 98.2 F | SYSTOLIC BLOOD PRESSURE: 135 MMHG | OXYGEN SATURATION: 99 % | BODY MASS INDEX: 26 KG/M2 | WEIGHT: 220.2 LBS

## 2025-03-13 DIAGNOSIS — J45.20 MILD INTERMITTENT REACTIVE AIRWAY DISEASE WITHOUT COMPLICATION: Primary | ICD-10-CM

## 2025-03-13 DIAGNOSIS — R42 DIZZINESS: ICD-10-CM

## 2025-03-13 DIAGNOSIS — Z12.5 SCREENING PSA (PROSTATE SPECIFIC ANTIGEN): ICD-10-CM

## 2025-03-13 DIAGNOSIS — J20.9 BRONCHOSPASM WITH BRONCHITIS, ACUTE: ICD-10-CM

## 2025-03-13 LAB
25(OH)D3 SERPL-MCNC: 18.4 NG/ML
ALBUMIN SERPL-MCNC: 4.4 G/DL (ref 3.4–5)
ALBUMIN/GLOB SERPL: 1.6 {RATIO} (ref 1.1–2.2)
ALP SERPL-CCNC: 84 U/L (ref 40–129)
ALT SERPL-CCNC: 70 U/L (ref 10–40)
ANION GAP SERPL CALCULATED.3IONS-SCNC: 10 MMOL/L (ref 3–16)
AST SERPL-CCNC: 35 U/L (ref 15–37)
BASOPHILS # BLD: 0 K/UL (ref 0–0.2)
BASOPHILS NFR BLD: 0.4 %
BILIRUB DIRECT SERPL-MCNC: 0.2 MG/DL (ref 0–0.3)
BILIRUB INDIRECT SERPL-MCNC: 0.3 MG/DL (ref 0–1)
BILIRUB SERPL-MCNC: 0.5 MG/DL (ref 0–1)
BUN SERPL-MCNC: 10 MG/DL (ref 7–20)
CALCIUM SERPL-MCNC: 9.5 MG/DL (ref 8.3–10.6)
CHLORIDE SERPL-SCNC: 102 MMOL/L (ref 99–110)
CHOLEST SERPL-MCNC: 219 MG/DL (ref 0–199)
CO2 SERPL-SCNC: 28 MMOL/L (ref 21–32)
CREAT SERPL-MCNC: 1 MG/DL (ref 0.9–1.3)
DEPRECATED RDW RBC AUTO: 13.6 % (ref 12.4–15.4)
EOSINOPHIL # BLD: 0 K/UL (ref 0–0.6)
EOSINOPHIL NFR BLD: 0.6 %
EST. AVERAGE GLUCOSE BLD GHB EST-MCNC: 128.4 MG/DL
GFR SERPLBLD CREATININE-BSD FMLA CKD-EPI: >90 ML/MIN/{1.73_M2}
GLUCOSE SERPL-MCNC: 106 MG/DL (ref 70–99)
HBA1C MFR BLD: 6.1 %
HCT VFR BLD AUTO: 44.8 % (ref 40.5–52.5)
HDLC SERPL-MCNC: 32 MG/DL (ref 40–60)
HGB BLD-MCNC: 14.6 G/DL (ref 13.5–17.5)
LDLC SERPL CALC-MCNC: 166 MG/DL
LYMPHOCYTES # BLD: 2.3 K/UL (ref 1–5.1)
LYMPHOCYTES NFR BLD: 52.6 %
MAGNESIUM SERPL-MCNC: 2.08 MG/DL (ref 1.8–2.4)
MCH RBC QN AUTO: 29.5 PG (ref 26–34)
MCHC RBC AUTO-ENTMCNC: 32.7 G/DL (ref 31–36)
MCV RBC AUTO: 90.3 FL (ref 80–100)
MONOCYTES # BLD: 0.5 K/UL (ref 0–1.3)
MONOCYTES NFR BLD: 11.6 %
NEUTROPHILS # BLD: 1.5 K/UL (ref 1.7–7.7)
NEUTROPHILS NFR BLD: 34.8 %
PLATELET # BLD AUTO: 219 K/UL (ref 135–450)
PMV BLD AUTO: 9.1 FL (ref 5–10.5)
POTASSIUM SERPL-SCNC: 4.6 MMOL/L (ref 3.5–5.1)
PROT SERPL-MCNC: 7.2 G/DL (ref 6.4–8.2)
PSA SERPL DL<=0.01 NG/ML-MCNC: 1.67 NG/ML (ref 0–4)
RBC # BLD AUTO: 4.96 M/UL (ref 4.2–5.9)
SODIUM SERPL-SCNC: 140 MMOL/L (ref 136–145)
TRIGL SERPL-MCNC: 103 MG/DL (ref 0–150)
TSH SERPL DL<=0.005 MIU/L-ACNC: 1.91 UIU/ML (ref 0.27–4.2)
VLDLC SERPL CALC-MCNC: 21 MG/DL
WBC # BLD AUTO: 4.3 K/UL (ref 4–11)

## 2025-03-13 PROCEDURE — 99214 OFFICE O/P EST MOD 30 MIN: CPT | Performed by: FAMILY MEDICINE

## 2025-03-13 PROCEDURE — 36415 COLL VENOUS BLD VENIPUNCTURE: CPT | Performed by: FAMILY MEDICINE

## 2025-03-13 RX ORDER — BENZONATATE 100 MG/1
100-200 CAPSULE ORAL 3 TIMES DAILY PRN
Qty: 60 CAPSULE | Refills: 0 | Status: SHIPPED | OUTPATIENT
Start: 2025-03-13 | End: 2025-03-20

## 2025-03-13 RX ORDER — ALBUTEROL SULFATE 90 UG/1
2 INHALANT RESPIRATORY (INHALATION) 4 TIMES DAILY PRN
Qty: 54 G | Refills: 1 | Status: SHIPPED | OUTPATIENT
Start: 2025-03-13

## 2025-03-13 RX ORDER — GUAIFENESIN 600 MG/1
1200 TABLET, EXTENDED RELEASE ORAL 2 TIMES DAILY
Qty: 40 TABLET | Refills: 0 | Status: SHIPPED | OUTPATIENT
Start: 2025-03-13 | End: 2025-03-23

## 2025-03-13 SDOH — ECONOMIC STABILITY: FOOD INSECURITY: WITHIN THE PAST 12 MONTHS, THE FOOD YOU BOUGHT JUST DIDN'T LAST AND YOU DIDN'T HAVE MONEY TO GET MORE.: NEVER TRUE

## 2025-03-13 SDOH — ECONOMIC STABILITY: FOOD INSECURITY: WITHIN THE PAST 12 MONTHS, YOU WORRIED THAT YOUR FOOD WOULD RUN OUT BEFORE YOU GOT MONEY TO BUY MORE.: NEVER TRUE

## 2025-03-13 ASSESSMENT — ENCOUNTER SYMPTOMS
DIARRHEA: 0
COUGH: 0
SHORTNESS OF BREATH: 0
VOMITING: 0
CONSTIPATION: 0
NAUSEA: 0

## 2025-03-13 NOTE — PROGRESS NOTES
Devyn Wheeler (:  1980) is a 44 y.o. male,Established patient, here for evaluation of the following chief complaint(s):  Blood Work, Annual Exam, Dizziness (Occasional pt states he doesn't get off balance), and Cough (Has been ongoing for two weeks pt states cough is dry at times and the has a lot of mucus )    Kids now aged 3 and 5?  Occasional dizziness, cough that is mucus producting    SUBJECTIVE:  3.13.25:  Has not been seen since May 2022, almost 3 years ago    Has had this cough x 2 weeks, out of nowhere.   Sometime dry, sometimes mucus.     2 months -- dizziness.   Off balance, no nausea or vomiting. May feel like room is spinning.     --  colonoscopy done by Dr. Cochran -- next return in November this year (2-3 years)     Subjective:  - Cough for 2 weeks: dry at times, productive at other times with difficulty expectorating  - Choking sensation >3 weeks ago: occurs with smoke exposure, AC air, lasting 10-20 seconds  - Associated symptoms: headache, intermittent dizziness x 2 months (off balance sensation, no room spinning)  - Dizziness occurs with movement, no falls/syncope/nausea  - Notes dizziness resolved since starting Ramadan fasting 2 weeks ago  - No preceding illness  - No fever/chills/vomiting  - Children at home with runny nose and cough    Past Medical History:  - Hypercholesterolemia (not currently taking medication)  - Colonoscopy by Dr. Cochran in : normal, follow up recommended 2025    Objective:  - Lungs: Clear breath sounds, occasional cough during exam  - Vital signs not obtained    Assessment & Plan:  1. Cough with bronchospasm  - Likely post-viral  - Treatment: Albuterol MDI 2 puffs up to QID prn  - Treatment: Guaifenesin for mucus  - Counseled on likely viral etiology    2. Intermittent dizziness  - Resolved with fasting, possible food-related trigger  - Plan: Monitor symptoms after Ramadan  - Plan: Keep food diary if symptoms recur  - Investigations: CBC,

## 2025-03-17 ENCOUNTER — RESULTS FOLLOW-UP (OUTPATIENT)
Dept: PRIMARY CARE CLINIC | Age: 45
End: 2025-03-17

## 2025-03-17 DIAGNOSIS — E78.2 MIXED HYPERLIPIDEMIA: ICD-10-CM

## 2025-03-17 DIAGNOSIS — E55.9 VITAMIN D DEFICIENCY: Primary | ICD-10-CM

## 2025-03-17 RX ORDER — ATORVASTATIN CALCIUM 20 MG/1
20 TABLET, FILM COATED ORAL DAILY
Qty: 90 TABLET | Refills: 2 | Status: SHIPPED | OUTPATIENT
Start: 2025-03-17

## 2025-03-17 RX ORDER — ERGOCALCIFEROL 1.25 MG/1
50000 CAPSULE, LIQUID FILLED ORAL WEEKLY
Qty: 12 CAPSULE | Refills: 1 | Status: SHIPPED | OUTPATIENT
Start: 2025-03-17

## 2025-04-28 ENCOUNTER — OFFICE VISIT (OUTPATIENT)
Dept: PRIMARY CARE CLINIC | Age: 45
End: 2025-04-28
Payer: COMMERCIAL

## 2025-04-28 VITALS
OXYGEN SATURATION: 96 % | SYSTOLIC BLOOD PRESSURE: 120 MMHG | HEIGHT: 77 IN | DIASTOLIC BLOOD PRESSURE: 82 MMHG | TEMPERATURE: 98.2 F | WEIGHT: 223 LBS | BODY MASS INDEX: 26.33 KG/M2 | HEART RATE: 80 BPM

## 2025-04-28 DIAGNOSIS — E55.9 VITAMIN D DEFICIENCY: ICD-10-CM

## 2025-04-28 DIAGNOSIS — J30.2 SEASONAL ALLERGIES: ICD-10-CM

## 2025-04-28 DIAGNOSIS — E78.2 MIXED HYPERLIPIDEMIA: Primary | ICD-10-CM

## 2025-04-28 PROCEDURE — 99214 OFFICE O/P EST MOD 30 MIN: CPT | Performed by: FAMILY MEDICINE

## 2025-04-28 ASSESSMENT — ENCOUNTER SYMPTOMS
SHORTNESS OF BREATH: 0
VOMITING: 0
COUGH: 0
DIARRHEA: 0
NAUSEA: 0
CONSTIPATION: 0

## 2025-04-28 NOTE — PROGRESS NOTES
question, and please allow one full business day for a call back. Calling us back multiple times keeps us from being able to complete the work efficiently for you and our other patients.    For medication renewals, please call your pharmacist to contact us, and be sure to allow at least 3 business days for processing before you need to  your medication.     If you are sick or need an appointment that hasn't been planned, same day appointments are available every day the office is open: Monday, Tuesday, Wednesday, Thursday, and Friday.  Call during office hours to schedule, even if it may not be with your regular physician. You may also call the office after 8 am on office days if you need to be seen from an issue the night before.    During hours when the office is not normally open, your call will go to the messaging service which cannot provide any service other than paging the doctor. No prescriptions or other nonurgent matters will be handled and no voicemail is available, so please call back during office hours for these matters.       Electronically signed by Roseann Alarcon DO on 4/28/2025 at 4:37 PM.

## 2025-07-25 DIAGNOSIS — E78.2 MIXED HYPERLIPIDEMIA: ICD-10-CM

## 2025-07-25 DIAGNOSIS — E55.9 VITAMIN D DEFICIENCY: ICD-10-CM

## 2025-07-25 PROCEDURE — 36415 COLL VENOUS BLD VENIPUNCTURE: CPT | Performed by: FAMILY MEDICINE

## 2025-07-28 ENCOUNTER — OFFICE VISIT (OUTPATIENT)
Dept: PRIMARY CARE CLINIC | Age: 45
End: 2025-07-28
Payer: COMMERCIAL

## 2025-07-28 VITALS
DIASTOLIC BLOOD PRESSURE: 82 MMHG | WEIGHT: 225.2 LBS | OXYGEN SATURATION: 97 % | HEART RATE: 96 BPM | BODY MASS INDEX: 26.59 KG/M2 | SYSTOLIC BLOOD PRESSURE: 129 MMHG | TEMPERATURE: 97.5 F | HEIGHT: 77 IN

## 2025-07-28 DIAGNOSIS — J30.2 SEASONAL ALLERGIES: ICD-10-CM

## 2025-07-28 DIAGNOSIS — N52.9 ERECTILE DYSFUNCTION, UNSPECIFIED ERECTILE DYSFUNCTION TYPE: ICD-10-CM

## 2025-07-28 DIAGNOSIS — J30.2 SEASONAL ALLERGIES: Primary | ICD-10-CM

## 2025-07-28 DIAGNOSIS — E78.2 MIXED HYPERLIPIDEMIA: ICD-10-CM

## 2025-07-28 PROCEDURE — 99214 OFFICE O/P EST MOD 30 MIN: CPT | Performed by: FAMILY MEDICINE

## 2025-07-28 RX ORDER — MONTELUKAST SODIUM 10 MG/1
10 TABLET ORAL DAILY
Qty: 30 TABLET | Refills: 3 | Status: SHIPPED | OUTPATIENT
Start: 2025-07-28

## 2025-07-28 RX ORDER — SILDENAFIL 50 MG/1
50 TABLET, FILM COATED ORAL DAILY PRN
Qty: 30 TABLET | Refills: 0 | Status: SHIPPED | OUTPATIENT
Start: 2025-07-28

## 2025-07-28 RX ORDER — ATORVASTATIN CALCIUM 20 MG/1
20 TABLET, FILM COATED ORAL DAILY
Qty: 90 TABLET | Refills: 2 | Status: SHIPPED | OUTPATIENT
Start: 2025-07-28

## 2025-07-28 RX ORDER — MONTELUKAST SODIUM 10 MG/1
10 TABLET ORAL DAILY
Qty: 90 TABLET | OUTPATIENT
Start: 2025-07-28

## 2025-07-28 ASSESSMENT — ENCOUNTER SYMPTOMS
CONSTIPATION: 0
VOMITING: 0
NAUSEA: 0
DIARRHEA: 0
COUGH: 1
SHORTNESS OF BREATH: 0

## 2025-07-28 NOTE — PROGRESS NOTES
Devyn Wheeler (:  1980) is a 45 y.o. male,Established patient, here for evaluation of the following chief complaint(s):  Medication Check (3 month follow up ), Discuss Labs, and Cough (Dry cough on and off for almost 3 weeks )    Kids now aged 3 and 5?  Occasional dizziness, cough that is mucus producting      SUBJECTIVE:  7.28.25:    Hyperlipidemia:  LDL Goal < 100  Statin: tolerating well  - no myalgias  - Low cholesterol diet  - Lipid panel annually  - continue current statin    Dry cough again, every day, all day, does not change at night vs daytime  X 3 weeks  -- will treat as allergies related    Erection issues with strength of erection over last 4 weeks    Subjective:  - C/O persistent cough and throat clearing for 3 wks. Symptoms occur daily, regardless of indoor or outdoor environment, and are not worse at night or during the day. Pt denies specific location for chest discomfort.  - Pt reports feeling more fatigued and out of breath.  - Pt reports previous use of Allegra for 1 wk, which provided no relief. Pt also used Zyrtec for 1 wk with initial improvement but then stopped. Still has Zyrtec and Allegra.    Past Medical History:  - Hypercholesterolemia, Vitamin D deficiency.    Objective:  - Cholesterol: LDL 97 mg/dL (previously 166 mg/dL), Total Cholesterol 150 mg/dL (previously 219 mg/dL).  - Vitamin D: 39.2 ng/mL (previously 18 ng/mL).    Assessment & Plan:  1. Persistent cough and throat clearing  - Assessment: Likely allergic rhinitis related.  - Treatment planned:  - Start daily antihistamine. Options discussed include Zyrtec or Allegra.  - Add Singulair to current medication regimen. Pt to take Singulair with either Zyrtec or Allegra at the same time.  - Counselled on potential for allergies to cause airway irritation, leading to coughing and throat clearing due to perceived closure, stickiness, or mucus.  - Relevant referrals: Consider ENT referral if symptoms do not improve with

## 2025-08-27 DIAGNOSIS — J30.2 SEASONAL ALLERGIES: ICD-10-CM

## 2025-08-27 RX ORDER — MONTELUKAST SODIUM 10 MG/1
10 TABLET ORAL DAILY
Qty: 90 TABLET | Refills: 0 | Status: SHIPPED | OUTPATIENT
Start: 2025-08-27